# Patient Record
Sex: MALE | Race: WHITE | NOT HISPANIC OR LATINO | URBAN - METROPOLITAN AREA
[De-identification: names, ages, dates, MRNs, and addresses within clinical notes are randomized per-mention and may not be internally consistent; named-entity substitution may affect disease eponyms.]

---

## 2019-07-24 PROBLEM — Z00.00 ENCOUNTER FOR PREVENTIVE HEALTH EXAMINATION: Status: ACTIVE | Noted: 2019-07-24

## 2019-10-22 NOTE — H&P ADULT - NSHPLABSRESULTS_GEN_ALL_CORE
12.7   5.21  )-----------( 119      ( 23 Oct 2019 10:39 )             38.6       10-23    142  |  106  |  23  ----------------------------<  90  4.6   |  24  |  1.03    Ca    9.5      23 Oct 2019 10:39    TPro  6.5  /  Alb  4.3  /  TBili  0.6  /  DBili  x   /  AST  25  /  ALT  24  /  AlkPhos  50  10-23      PT/INR - ( 23 Oct 2019 10:39 )   PT: 12.0 sec;   INR: 1.06          PTT - ( 23 Oct 2019 10:39 )  PTT:29.2 sec    CARDIAC MARKERS ( 23 Oct 2019 10:39 )  x     / x     / 55 U/L / x     / 1.7 ng/mL

## 2019-10-22 NOTE — H&P ADULT - NSHPPHYSICALEXAM_GEN_ALL_CORE
V/S 	BP:  132/67     HR: 74	   RR: 18     T: 98	O2: 97% RA   	  GEN: NAD  PULM:  CTA B/L  HEENT: No JVD  CARD: RRR, S1 and S2   ABD: +BS, NT, soft/ND	  EXT: No Edema B/L LE  NEURO: A+O to person, place, month and year, no focal deficit noted  PSYCH: Mood appropriate   PULSES: 2+ Radial b/l, 2+ Femoral b/l (no bruit b/l), DP 2+ b/l, PT Faint B/L  ASA III, Mallampati III

## 2019-10-22 NOTE — H&P ADULT - ASSESSMENT
79 y/o M POOR HISTORIAN WHO LIVES IN A GROUP HOME, w/ PMHX HTN, Hyperlipidemia, Gout, h/o Kirby's Esophagitis (reports h/o unknown abdominal surgery / colostomy), s/p PPM (reports possible generator change/upgrade several months ago), chronic systolic CHF – reports h/o recent exacerbation at [facility] 4 weeks ago, CAD h/o CABG in 1996 (LIMA-LAD, jump graft SVG-D1/OM1, SVG-PDA, w/ CCS Angina Equivalent Class 3 Sx and Abnormal NST     In light of pt's risk factors, h/o CAD, CCS Class 3 anginal equivalent sx, and abnormal NST pt presents for recommended cardiac catheterization with possible intervention.    H/H 12/38. Pt denies bleeding, GI bleeding, hematemesis, hematuria, BRBPR or melena. ASA 35mg and Plavix 600mg pre-cath as per Dr. Rasmussen  IV ½ NS@ 30 cc/hr pre-cath to start only in procedure room after EDP checked and MD approved.  Thrombocytopenia noted, Platelet count 119, MD aware  Type of Sedation: Moderate  Candidate for Sedation: Yes  Risks & benefits of procedure and alternative therapy have been explained to the patient including but not limited to: allergic reaction, bleeding w/possible need for blood transfusion, infection, renal and vascular compromise, limb damage, arrhythmia, stroke, vessel dissection/perforation, Myocardial infarction, emergent CABG. Informed consent obtained and in chart 79 y/o M POOR HISTORIAN WHO LIVES IN A GROUP HOME, w/ PMHX HTN, Hyperlipidemia, Gout, h/o Kirby's Esophagitis (reports h/o unknown abdominal surgery / colostomy), s/p PPM (reports possible generator change/upgrade several months ago), chronic systolic CHF – reports h/o recent exacerbation at [facility] 4 weeks ago, CAD h/o CABG in 1996 (LIMA-LAD, jump graft SVG-D1/OM1, SVG-PDA, w/ CCS Angina Equivalent Class 3 Sx and Abnormal NST     In light of pt's risk factors, h/o CAD, CCS Class 3 anginal equivalent sx, and abnormal NST pt presents for recommended cardiac catheterization with possible intervention.    H/H 12/38. Pt denies bleeding, GI bleeding, hematemesis, hematuria, BRBPR or melena. ASA 325mg and Plavix 600mg pre-cath as per Dr. Rasmussen  IV ½ NS@ 30 cc/hr pre-cath to start only in procedure room after EDP checked and MD approved.  Thrombocytopenia noted, Platelet count 119, MD aware  Type of Sedation: Moderate  Candidate for Sedation: Yes  Risks & benefits of procedure and alternative therapy have been explained to the patient including but not limited to: allergic reaction, bleeding w/possible need for blood transfusion, infection, renal and vascular compromise, limb damage, arrhythmia, stroke, vessel dissection/perforation, Myocardial infarction, emergent CABG. Informed consent obtained and in chart

## 2019-10-22 NOTE — H&P ADULT - HISTORY OF PRESENT ILLNESS
SKELETON    77 yo male, PMHx HTN, Hyperlipidemia, BPH, gout, Kirby's Esophagitis, s/p PPM, chronic systolic CHF (EF 37% NST 10/20/19), CAD h/o CABG in 1996 (LIMA-LAD, jump graft SVG-D1/OM1, SVG-PDA) presented to cardiologist endorsing dyspnea on exertion with associated dry coughwith 5 stairs worsening over the last month. Denies chest pain, dizziness, palpitations, nausea. Echo on 08/17/18 Akinesis of basal inferior wall, basal septum. EF 40%. All other segments are normal. Moderate AI. Mild TR. Trace MR. NST 10/12/19 extensive inferior and inferoseptal LV scarring in the presence of moderate segmental impairment of LV systolic function. There is equivocal evidence of mild basal anterolateral ischemia. EF 37% at rest.     In light of pt's risk factors, CCS Class 3 angina, and abnormal NST pt presents for recommended cardiac catheterization with possible intervention. History obtained from patient, MD notes in chart and Melly Castillo (Group Home Assistant)    79 y/o M POOR HISTORIAN WHO LIVES IN A GROUP HOME, w/ PMHX HTN, Hyperlipidemia, Gout, h/o Kirby's Esophagitis (reports h/o unknown abdominal surgery / colostomy approximately 9 yrs ago), s/p PPM approximately 9 yrs ago (reports possible generator change/upgrade several months ago), chronic systolic CHF (EF 37% NST 10/2019) – reports h/o recent exacerbation at [facility] 4 weeks ago,  CAD h/o CABG in 1996 (LIMA-LAD, jump graft SVG-D1/OM1, SVG-PDA in chart, unsure if cardiac angiography was done after CABG) who presented to his cardiologist endorsing DISLA on climbing 2 flights of stairs, worsening over the last month (CCS Angina Class Equivalent Sx). Pt had a NST on 10/12/19 revealing extensive inferior and inferoseptal LV scarring in the presence of moderate segmental impairment of LV systolic function. There is equivocal evidence of mild basal anterolateral ischemia. EF 37% at rest. Echo in 08/2018 showed Akinesis of basal inferior wall, basal septum, moderate AR, EF 40%.     In light of pt's risk factors, h/o CAD, CCS Class 3 anginal equivalent sx, and abnormal NST pt presents for recommended cardiac catheterization with possible intervention.

## 2019-10-23 ENCOUNTER — OUTPATIENT (OUTPATIENT)
Dept: OUTPATIENT SERVICES | Facility: HOSPITAL | Age: 79
LOS: 1 days | Discharge: NURSING, REHAB OR LONG-TERM CARE | End: 2019-10-23
Payer: MEDICARE

## 2019-10-23 LAB
ALBUMIN SERPL ELPH-MCNC: 4.3 G/DL — SIGNIFICANT CHANGE UP (ref 3.3–5)
ALP SERPL-CCNC: 50 U/L — SIGNIFICANT CHANGE UP (ref 40–120)
ALT FLD-CCNC: 24 U/L — SIGNIFICANT CHANGE UP (ref 10–45)
ANION GAP SERPL CALC-SCNC: 12 MMOL/L — SIGNIFICANT CHANGE UP (ref 5–17)
APTT BLD: 29.2 SEC — SIGNIFICANT CHANGE UP (ref 27.5–36.3)
AST SERPL-CCNC: 25 U/L — SIGNIFICANT CHANGE UP (ref 10–40)
BASOPHILS # BLD AUTO: 0.02 K/UL — SIGNIFICANT CHANGE UP (ref 0–0.2)
BASOPHILS NFR BLD AUTO: 0.4 % — SIGNIFICANT CHANGE UP (ref 0–2)
BILIRUB SERPL-MCNC: 0.6 MG/DL — SIGNIFICANT CHANGE UP (ref 0.2–1.2)
BUN SERPL-MCNC: 23 MG/DL — SIGNIFICANT CHANGE UP (ref 7–23)
CALCIUM SERPL-MCNC: 9.5 MG/DL — SIGNIFICANT CHANGE UP (ref 8.4–10.5)
CHLORIDE SERPL-SCNC: 106 MMOL/L — SIGNIFICANT CHANGE UP (ref 96–108)
CHOLEST SERPL-MCNC: 122 MG/DL — SIGNIFICANT CHANGE UP (ref 10–199)
CK MB CFR SERPL CALC: 1.7 NG/ML — SIGNIFICANT CHANGE UP (ref 0–6.7)
CK SERPL-CCNC: 55 U/L — SIGNIFICANT CHANGE UP (ref 30–200)
CO2 SERPL-SCNC: 24 MMOL/L — SIGNIFICANT CHANGE UP (ref 22–31)
CREAT SERPL-MCNC: 1.03 MG/DL — SIGNIFICANT CHANGE UP (ref 0.5–1.3)
EOSINOPHIL # BLD AUTO: 0.2 K/UL — SIGNIFICANT CHANGE UP (ref 0–0.5)
EOSINOPHIL NFR BLD AUTO: 3.8 % — SIGNIFICANT CHANGE UP (ref 0–6)
GLUCOSE SERPL-MCNC: 90 MG/DL — SIGNIFICANT CHANGE UP (ref 70–99)
HCT VFR BLD CALC: 38.6 % — LOW (ref 39–50)
HDLC SERPL-MCNC: 57 MG/DL — SIGNIFICANT CHANGE UP
HGB BLD-MCNC: 12.7 G/DL — LOW (ref 13–17)
IMM GRANULOCYTES NFR BLD AUTO: 0.2 % — SIGNIFICANT CHANGE UP (ref 0–1.5)
INR BLD: 1.06 — SIGNIFICANT CHANGE UP (ref 0.88–1.16)
LIPID PNL WITH DIRECT LDL SERPL: 50 MG/DL — SIGNIFICANT CHANGE UP
LYMPHOCYTES # BLD AUTO: 1.87 K/UL — SIGNIFICANT CHANGE UP (ref 1–3.3)
LYMPHOCYTES # BLD AUTO: 35.9 % — SIGNIFICANT CHANGE UP (ref 13–44)
MCHC RBC-ENTMCNC: 32.5 PG — SIGNIFICANT CHANGE UP (ref 27–34)
MCHC RBC-ENTMCNC: 32.9 GM/DL — SIGNIFICANT CHANGE UP (ref 32–36)
MCV RBC AUTO: 98.7 FL — SIGNIFICANT CHANGE UP (ref 80–100)
MONOCYTES # BLD AUTO: 0.37 K/UL — SIGNIFICANT CHANGE UP (ref 0–0.9)
MONOCYTES NFR BLD AUTO: 7.1 % — SIGNIFICANT CHANGE UP (ref 2–14)
NEUTROPHILS # BLD AUTO: 2.74 K/UL — SIGNIFICANT CHANGE UP (ref 1.8–7.4)
NEUTROPHILS NFR BLD AUTO: 52.6 % — SIGNIFICANT CHANGE UP (ref 43–77)
NRBC # BLD: 0 /100 WBCS — SIGNIFICANT CHANGE UP (ref 0–0)
PLATELET # BLD AUTO: 119 K/UL — LOW (ref 150–400)
POTASSIUM SERPL-MCNC: 4.6 MMOL/L — SIGNIFICANT CHANGE UP (ref 3.5–5.3)
POTASSIUM SERPL-SCNC: 4.6 MMOL/L — SIGNIFICANT CHANGE UP (ref 3.5–5.3)
PROT SERPL-MCNC: 6.5 G/DL — SIGNIFICANT CHANGE UP (ref 6–8.3)
PROTHROM AB SERPL-ACNC: 12 SEC — SIGNIFICANT CHANGE UP (ref 10–12.9)
RBC # BLD: 3.91 M/UL — LOW (ref 4.2–5.8)
RBC # FLD: 13.9 % — SIGNIFICANT CHANGE UP (ref 10.3–14.5)
SODIUM SERPL-SCNC: 142 MMOL/L — SIGNIFICANT CHANGE UP (ref 135–145)
TOTAL CHOLESTEROL/HDL RATIO MEASUREMENT: 2.1 RATIO — LOW (ref 3.4–9.6)
TRIGL SERPL-MCNC: 74 MG/DL — SIGNIFICANT CHANGE UP (ref 10–149)
WBC # BLD: 5.21 K/UL — SIGNIFICANT CHANGE UP (ref 3.8–10.5)
WBC # FLD AUTO: 5.21 K/UL — SIGNIFICANT CHANGE UP (ref 3.8–10.5)

## 2019-10-23 PROCEDURE — 85025 COMPLETE CBC W/AUTO DIFF WBC: CPT

## 2019-10-23 PROCEDURE — 93459 L HRT ART/GRFT ANGIO: CPT | Mod: 26

## 2019-10-23 PROCEDURE — C1894: CPT

## 2019-10-23 PROCEDURE — 93459 L HRT ART/GRFT ANGIO: CPT

## 2019-10-23 PROCEDURE — 93005 ELECTROCARDIOGRAM TRACING: CPT

## 2019-10-23 PROCEDURE — 82553 CREATINE MB FRACTION: CPT

## 2019-10-23 PROCEDURE — 80053 COMPREHEN METABOLIC PANEL: CPT

## 2019-10-23 PROCEDURE — 85730 THROMBOPLASTIN TIME PARTIAL: CPT

## 2019-10-23 PROCEDURE — 93010 ELECTROCARDIOGRAM REPORT: CPT

## 2019-10-23 PROCEDURE — C1887: CPT

## 2019-10-23 PROCEDURE — 80061 LIPID PANEL: CPT

## 2019-10-23 PROCEDURE — C1760: CPT

## 2019-10-23 PROCEDURE — 82550 ASSAY OF CK (CPK): CPT

## 2019-10-23 PROCEDURE — 85610 PROTHROMBIN TIME: CPT

## 2019-10-23 PROCEDURE — C1769: CPT

## 2019-10-23 RX ORDER — CHOLECALCIFEROL (VITAMIN D3) 125 MCG
1 CAPSULE ORAL
Qty: 0 | Refills: 0 | DISCHARGE

## 2019-10-23 RX ORDER — SIMVASTATIN 20 MG/1
1 TABLET, FILM COATED ORAL
Qty: 0 | Refills: 0 | DISCHARGE

## 2019-10-23 RX ORDER — CLOPIDOGREL BISULFATE 75 MG/1
600 TABLET, FILM COATED ORAL ONCE
Refills: 0 | Status: DISCONTINUED | OUTPATIENT
Start: 2019-10-23 | End: 2019-10-23

## 2019-10-23 RX ORDER — ALLOPURINOL 300 MG
1 TABLET ORAL
Qty: 0 | Refills: 0 | DISCHARGE

## 2019-10-23 RX ORDER — SODIUM CHLORIDE 9 MG/ML
500 INJECTION, SOLUTION INTRAVENOUS
Refills: 0 | Status: DISCONTINUED | OUTPATIENT
Start: 2019-10-23 | End: 2019-10-23

## 2019-10-23 RX ORDER — FINASTERIDE 5 MG/1
1 TABLET, FILM COATED ORAL
Qty: 0 | Refills: 0 | DISCHARGE

## 2019-10-23 RX ORDER — ALENDRONATE SODIUM 70 MG/1
1 TABLET ORAL
Qty: 0 | Refills: 0 | DISCHARGE

## 2019-10-23 RX ORDER — DOCUSATE SODIUM 100 MG
1 CAPSULE ORAL
Qty: 0 | Refills: 0 | DISCHARGE

## 2019-10-23 RX ORDER — LISINOPRIL 2.5 MG/1
1 TABLET ORAL
Qty: 0 | Refills: 0 | DISCHARGE

## 2019-10-23 RX ORDER — ASPIRIN/CALCIUM CARB/MAGNESIUM 324 MG
1 TABLET ORAL
Qty: 0 | Refills: 0 | DISCHARGE

## 2019-10-23 RX ORDER — ASPIRIN/CALCIUM CARB/MAGNESIUM 324 MG
325 TABLET ORAL ONCE
Refills: 0 | Status: COMPLETED | OUTPATIENT
Start: 2019-10-23 | End: 2019-10-23

## 2019-10-23 RX ORDER — CARVEDILOL PHOSPHATE 80 MG/1
1 CAPSULE, EXTENDED RELEASE ORAL
Qty: 0 | Refills: 0 | DISCHARGE

## 2019-10-23 RX ORDER — NITROGLYCERIN 6.5 MG
1 CAPSULE, EXTENDED RELEASE ORAL
Qty: 0 | Refills: 0 | DISCHARGE

## 2019-10-23 RX ORDER — MIRABEGRON 50 MG/1
1 TABLET, EXTENDED RELEASE ORAL
Qty: 0 | Refills: 0 | DISCHARGE

## 2019-10-23 RX ORDER — TAMSULOSIN HYDROCHLORIDE 0.4 MG/1
1 CAPSULE ORAL
Qty: 0 | Refills: 0 | DISCHARGE

## 2019-10-23 RX ORDER — CLOPIDOGREL BISULFATE 75 MG/1
600 TABLET, FILM COATED ORAL ONCE
Refills: 0 | Status: COMPLETED | OUTPATIENT
Start: 2019-10-23 | End: 2019-10-23

## 2019-10-23 RX ORDER — CHLORHEXIDINE GLUCONATE 213 G/1000ML
1 SOLUTION TOPICAL ONCE
Refills: 0 | Status: DISCONTINUED | OUTPATIENT
Start: 2019-10-23 | End: 2019-10-23

## 2019-10-23 RX ORDER — OMEPRAZOLE 10 MG/1
1 CAPSULE, DELAYED RELEASE ORAL
Qty: 0 | Refills: 0 | DISCHARGE

## 2019-10-23 RX ORDER — FUROSEMIDE 40 MG
1 TABLET ORAL
Qty: 0 | Refills: 0 | DISCHARGE

## 2019-10-23 RX ADMIN — Medication 325 MILLIGRAM(S): at 12:57

## 2019-10-23 RX ADMIN — CLOPIDOGREL BISULFATE 600 MILLIGRAM(S): 75 TABLET, FILM COATED ORAL at 12:57

## 2019-10-23 NOTE — PROGRESS NOTE ADULT - SUBJECTIVE AND OBJECTIVE BOX
Procedure: [facility], Angioseal  Indication: UA, CAD, +EST  Complication: none    Result:  1) Three vessel CAD (100% mLAD, 100% pLCX, 100% pRCA)  2) Patent LIMA to LAD which supplies distal collaterals to large D1  3) Patent sequential SVG to OM1 (D1 segment occluded)  4) Occluded SVG to RCA  5) LVEDP 15    Plan: Medical management. D/C home - to f/u with Dr. De Santiago.

## 2019-10-23 NOTE — PROGRESS NOTE ADULT - SUBJECTIVE AND OBJECTIVE BOX
Interventional Cardiology PA SDA Discharge Note    Patient without complaints. Ambulated and voided without difficulties    Afebrile, VSS    Ext:    		Right   Groin:  no  hematoma,  no   bruit, dressing; C/D/I      Pulses:    intact DP/PT to baseline     A/P:  79 y/o M POOR HISTORIAN WHO LIVES IN A GROUP HOME, w/ PMHX HTN, Hyperlipidemia, Gout, h/o Kirby's Esophagitis (reports h/o unknown abdominal surgery / colostomy approximately 9 yrs ago), s/p PPM approximately 9 yrs ago (reports possible generator change/upgrade several months ago), chronic systolic CHF (EF 37% NST 10/2019) – reports h/o recent exacerbation at [facility] 4 weeks ago,  CAD h/o CABG in 1996 (LIMA-LAD, jump graft SVG-D1/OM1, SVG-PDA in chart, unsure if cardiac angiography was done after CABG) who presented to his cardiologist endorsing DISLA on climbing 2 flights of stairs, worsening over the last month (CCS Angina Class Equivalent Sx). Pt had a NST on 10/12/19 revealing extensive inferior and inferoseptal LV scarring in the presence of moderate segmental impairment of LV systolic function. There is equivocal evidence of mild basal anterolateral ischemia. EF 37% at rest. Echo in 08/2018 showed Akinesis of basal inferior wall, basal septum, moderate AR, EF 40%. In light of pt's risk factors, h/o CAD, CCS Class 3 anginal equivalent sx, and abnormal NST pt presents for recommended cardiac catheterization with possible intervention.    Pt now s/p diagnostic cardiac catheterization on 10/23/19 showing three vessel CAD (100% mLAD, 100% pLCX, 100% pRCA). Patent LIMA to LAD which supplies distal collaterals to large D1. Patent sequential SVG to OM1 (D1 segment occluded). Occluded SVG to RCA. EF 40% from Echo 10/2019. LVEDP 15. Plan: Medical management. D/C home - to f/u with Dr. De Santiago. Right groin AS.       1.	Stable for discharge as per attending  __Reimers_______ after bed rest, pt voids, groin/wrist stable and 30 minutes of ambulation.  2.	Follow-up with PMD/Cardiologist ___Dr De Santiago________ in 1-2 weeks  3.	Discharged forms signed and copies in chart

## 2023-08-29 ENCOUNTER — APPOINTMENT (OUTPATIENT)
Dept: HEART AND VASCULAR | Facility: CLINIC | Age: 83
End: 2023-08-29
Payer: MEDICARE

## 2023-08-29 VITALS
SYSTOLIC BLOOD PRESSURE: 102 MMHG | TEMPERATURE: 98.4 F | WEIGHT: 128 LBS | HEART RATE: 70 BPM | HEIGHT: 66 IN | DIASTOLIC BLOOD PRESSURE: 44 MMHG | OXYGEN SATURATION: 96 % | BODY MASS INDEX: 20.57 KG/M2

## 2023-08-29 DIAGNOSIS — Z86.79 PERSONAL HISTORY OF OTHER DISEASES OF THE CIRCULATORY SYSTEM: ICD-10-CM

## 2023-08-29 PROCEDURE — 99203 OFFICE O/P NEW LOW 30 MIN: CPT | Mod: 25

## 2023-08-29 PROCEDURE — 93280 PM DEVICE PROGR EVAL DUAL: CPT

## 2023-08-30 PROBLEM — Z86.79 HISTORY OF ISCHEMIC CARDIOMYOPATHY: Status: RESOLVED | Noted: 2023-08-30 | Resolved: 2023-08-30

## 2023-08-30 PROBLEM — Z86.79 HISTORY OF ATRIAL FIBRILLATION: Status: RESOLVED | Noted: 2023-08-30 | Resolved: 2023-08-30

## 2023-08-30 PROBLEM — Z86.79 HISTORY OF CORONARY ARTERY DISEASE: Status: RESOLVED | Noted: 2023-08-30 | Resolved: 2023-08-30

## 2023-08-30 NOTE — REVIEW OF SYSTEMS
[Negative] : Heme/Lymph [Fever] : no fever [Feeling Fatigued] : not feeling fatigued [SOB] : no shortness of breath [Dyspnea on exertion] : not dyspnea during exertion [Palpitations] : no palpitations [Orthopnea] : no orthopnea [Syncope] : no syncope [Dizziness] : no dizziness [Weakness] : no weakness

## 2023-08-30 NOTE — HISTORY OF PRESENT ILLNESS
[FreeTextEntry1] : 82yoM, resides in a group home, PMHx of CAD s/p CABG, ischemic CM (EF 35% by Echo 2022), pAfib (on Eliquis), sinus node dysfunction s/p PPM ([employer]) who is referred today as his PPM has reached VALERIE.  He reports feeling well and denies any symptoms of dizziness, syncope, palpitations, chest pain, LE swelling, orthopnea, or device related complaints.  Device check today shows the battery has reached VALERIE.  Otherwise functioning normally.   Echo 12/2022: LA mildly dilated, RV mildly dilated, EF 35%, inferior wall motion is akinetic, all other segments are normal, moderate AR, mild to moderate TR, mild CO.

## 2023-08-30 NOTE — ADDENDUM
[FreeTextEntry1] : I, Merlene Paul, am scribing for and the presence of Dr. Munoz the following sections: HPI, PMH,Family/social history, ROS, Physical Exam, Assessment / Plan.  I, Ankita Munoz, personally performed the services described in the documentation, reviewed the documentation recorded by the scribe in my presence and it accurately and completely records my words and actions.

## 2023-08-30 NOTE — PHYSICAL EXAM
[Well Nourished] : well nourished [No Acute Distress] : no acute distress [Frail] : frail [Normal Conjunctiva] : normal conjunctiva [Normal S1, S2] : normal S1, S2 [No Murmur] : no murmur [No Rub] : no rub [No Gallop] : no gallop [Clear Lung Fields] : clear lung fields [Good Air Entry] : good air entry [No Respiratory Distress] : no respiratory distress  [Soft] : abdomen soft [Non Tender] : non-tender [No Masses/organomegaly] : no masses/organomegaly [Normal Bowel Sounds] : normal bowel sounds [No Edema] : no edema [No Cyanosis] : no cyanosis [No Clubbing] : no clubbing [No Varicosities] : no varicosities [No Skin Lesions] : no skin lesions [Moves all extremities] : moves all extremities [No Focal Deficits] : no focal deficits [Normal Speech] : normal speech [Alert and Oriented] : alert and oriented

## 2023-09-12 RX ORDER — APIXABAN 2.5 MG/1
2.5 TABLET, FILM COATED ORAL
Refills: 0 | Status: ACTIVE | COMMUNITY

## 2023-09-12 RX ORDER — CHLORHEXIDINE GLUCONATE 4 %
LIQUID (ML) TOPICAL
Refills: 0 | Status: ACTIVE | COMMUNITY

## 2023-09-12 RX ORDER — VITAMIN K2 90 MCG
1000 CAPSULE ORAL
Refills: 0 | Status: ACTIVE | COMMUNITY

## 2023-09-12 RX ORDER — ACETAMINOPHEN 500 MG/1
500 TABLET, COATED ORAL
Refills: 0 | Status: ACTIVE | COMMUNITY

## 2023-09-12 RX ORDER — SACUBITRIL AND VALSARTAN 24; 26 MG/1; MG/1
24-26 TABLET, FILM COATED ORAL
Refills: 0 | Status: ACTIVE | COMMUNITY

## 2023-09-12 RX ORDER — ALENDRONATE SODIUM 70 MG/1
70 TABLET ORAL
Refills: 0 | Status: ACTIVE | COMMUNITY

## 2023-09-12 RX ORDER — NITROGLYCERIN 0.4 MG/1
0.4 TABLET SUBLINGUAL
Refills: 0 | Status: ACTIVE | COMMUNITY

## 2023-09-12 RX ORDER — CARVEDILOL 6.25 MG/1
6.25 TABLET, FILM COATED ORAL
Refills: 0 | Status: ACTIVE | COMMUNITY

## 2023-09-12 RX ORDER — DOCUSATE SODIUM 100 MG/1
100 CAPSULE ORAL
Refills: 0 | Status: ACTIVE | COMMUNITY

## 2023-09-12 RX ORDER — ALLOPURINOL 300 MG/1
300 TABLET ORAL
Refills: 0 | Status: ACTIVE | COMMUNITY

## 2023-09-12 RX ORDER — TAMSULOSIN HYDROCHLORIDE 0.4 MG/1
0.4 CAPSULE ORAL
Refills: 0 | Status: ACTIVE | COMMUNITY

## 2023-09-12 RX ORDER — MIRABEGRON 25 MG/1
25 TABLET, FILM COATED, EXTENDED RELEASE ORAL
Refills: 0 | Status: ACTIVE | COMMUNITY

## 2023-09-12 RX ORDER — FUROSEMIDE 20 MG/1
20 TABLET ORAL
Refills: 0 | Status: ACTIVE | COMMUNITY

## 2023-09-13 ENCOUNTER — TRANSCRIPTION ENCOUNTER (OUTPATIENT)
Age: 83
End: 2023-09-13

## 2023-09-26 ENCOUNTER — APPOINTMENT (OUTPATIENT)
Dept: HEART AND VASCULAR | Facility: CLINIC | Age: 83
End: 2023-09-26
Payer: MEDICARE

## 2023-09-26 VITALS
TEMPERATURE: 98.7 F | HEIGHT: 66 IN | HEART RATE: 65 BPM | DIASTOLIC BLOOD PRESSURE: 60 MMHG | OXYGEN SATURATION: 97 % | SYSTOLIC BLOOD PRESSURE: 110 MMHG

## 2023-09-26 DIAGNOSIS — Z45.010 ENCOUNTER FOR CHECKING AND TESTING OF CARDIAC PACEMAKER PULSE GENERATOR [BATTERY]: ICD-10-CM

## 2023-09-26 DIAGNOSIS — I49.5 SICK SINUS SYNDROME: ICD-10-CM

## 2023-09-26 PROCEDURE — 99213 OFFICE O/P EST LOW 20 MIN: CPT

## 2023-09-26 RX ORDER — ATORVASTATIN CALCIUM 40 MG/1
40 TABLET, FILM COATED ORAL
Refills: 0 | Status: ACTIVE | COMMUNITY

## 2023-09-26 RX ORDER — OMEPRAZOLE 20 MG/1
20 CAPSULE, DELAYED RELEASE ORAL
Refills: 0 | Status: DISCONTINUED | COMMUNITY
End: 2023-09-26

## 2023-09-26 RX ORDER — SIMVASTATIN 40 MG/1
40 TABLET, FILM COATED ORAL
Refills: 0 | Status: DISCONTINUED | COMMUNITY
End: 2023-09-26

## 2023-09-26 RX ORDER — DOXAZOSIN 1 MG/1
1 TABLET ORAL
Refills: 0 | Status: ACTIVE | COMMUNITY

## 2023-09-27 PROBLEM — I49.5 SINUS NODE DYSFUNCTION: Status: ACTIVE | Noted: 2023-08-30

## 2023-10-19 ENCOUNTER — RESULT REVIEW (OUTPATIENT)
Age: 83
End: 2023-10-19

## 2023-10-24 PROBLEM — Z45.010 PACEMAKER AT END OF BATTERY LIFE: Status: ACTIVE | Noted: 2023-08-30

## 2023-12-12 ENCOUNTER — APPOINTMENT (OUTPATIENT)
Dept: HEART AND VASCULAR | Facility: CLINIC | Age: 83
End: 2023-12-12

## 2024-02-20 ENCOUNTER — NON-APPOINTMENT (OUTPATIENT)
Age: 84
End: 2024-02-20

## 2024-02-20 ENCOUNTER — APPOINTMENT (OUTPATIENT)
Dept: HEART AND VASCULAR | Facility: CLINIC | Age: 84
End: 2024-02-20
Payer: MEDICARE

## 2024-02-20 PROCEDURE — 93294 REM INTERROG EVL PM/LDLS PM: CPT

## 2024-02-20 PROCEDURE — 93296 REM INTERROG EVL PM/IDS: CPT

## 2024-02-27 ENCOUNTER — RESULT REVIEW (OUTPATIENT)
Age: 84
End: 2024-02-27

## 2024-02-29 ENCOUNTER — TRANSCRIPTION ENCOUNTER (OUTPATIENT)
Age: 84
End: 2024-02-29

## 2024-05-21 ENCOUNTER — APPOINTMENT (OUTPATIENT)
Dept: HEART AND VASCULAR | Facility: CLINIC | Age: 84
End: 2024-05-21

## 2024-06-25 ENCOUNTER — APPOINTMENT (OUTPATIENT)
Dept: HEART AND VASCULAR | Facility: CLINIC | Age: 84
End: 2024-06-25

## 2024-07-30 ENCOUNTER — APPOINTMENT (OUTPATIENT)
Dept: HEART AND VASCULAR | Facility: CLINIC | Age: 84
End: 2024-07-30